# Patient Record
(demographics unavailable — no encounter records)

---

## 2025-02-20 NOTE — PHYSICAL EXAM
[Normal Breath Sounds] : Normal breath sounds [Normal Heart Sounds] : normal heart sounds [Alert] : alert [Oriented to Person] : oriented to person [Oriented to Place] : oriented to place [Oriented to Time] : oriented to time [Calm] : calm [de-identified] : WNL [de-identified] : WNL [de-identified] : BENITOL [de-identified] : WNL [de-identified] : WNL [FreeTextEntry1] : Perianal inspection and digital exam unremarkable.  Mild internal hemorrhoid enlargement on anoscopy.  Anoscopy performed to evaluate anal canal.  No sedation required.

## 2025-02-20 NOTE — HISTORY OF PRESENT ILLNESS
[FreeTextEntry1] : Refugio is a 29 y/o male being seen for rectal bleeding.   Today pt reports feeling no pain.  Formed BMs 1-2 times daily, straining and rectal bleeding with BMs onto toilet bowl, from Saturday-Tuesday 2/8/25 - 2/11/25, last rectal bleeding episode was Monday 2/17/25.  Went to Urgent care in [place] for fever and chills.  Denies prolapsing tissues or swelling.  No incontinence of stool or flatus.  Good appetite.  No c/o nausea or vomiting.  Denies fever and chills.  Not on anticoagulants.

## 2025-02-20 NOTE — ASSESSMENT
[FreeTextEntry1] : I have seen and evaluated the patient, and I have corroborated all nursing input into this note.  The patient had rectal bleeding with bowel movements.  This has resolved.  He has mild hemorrhoid enlargement.  I prescribed a course of hydrocortisone suppositories.  The patient will contact my office immediately if his bleeding recurs.  If the bleeding does recur then arrangements will be made for a colonoscopy.

## 2025-02-20 NOTE — HISTORY OF PRESENT ILLNESS
[FreeTextEntry1] : Refugio is a 29 y/o male being seen for rectal bleeding.   Today pt reports feeling no pain.  Formed BMs 1-2 times daily, straining and rectal bleeding with BMs onto toilet bowl, from Saturday-Tuesday 2/8/25 - 2/11/25, last rectal bleeding episode was Monday 2/17/25.  Went to Urgent care in [place] for fever and chills.  Denies prolapsing tissues or swelling.  No incontinence of stool or flatus.  Good appetite.  No c/o nausea or vomiting.  Denies fever and chills.  Not on anticoagulants.

## 2025-02-20 NOTE — HISTORY OF PRESENT ILLNESS
[FreeTextEntry1] : Refugio is a 27 y/o male being seen for rectal bleeding.   Today pt reports feeling no pain.  Formed BMs 1-2 times daily, straining and rectal bleeding with BMs onto toilet bowl, from Saturday-Tuesday 2/8/25 - 2/11/25, last rectal bleeding episode was Monday 2/17/25.  Went to Urgent care in [place] for fever and chills.  Denies prolapsing tissues or swelling.  No incontinence of stool or flatus.  Good appetite.  No c/o nausea or vomiting.  Denies fever and chills.  Not on anticoagulants.

## 2025-02-20 NOTE — PHYSICAL EXAM
[Normal Breath Sounds] : Normal breath sounds [Normal Heart Sounds] : normal heart sounds [Alert] : alert [Oriented to Person] : oriented to person [Oriented to Place] : oriented to place [Oriented to Time] : oriented to time [Calm] : calm [de-identified] : WNL [de-identified] : BENITOL [de-identified] : WNL [de-identified] : WNL [de-identified] : WNL [FreeTextEntry1] : Perianal inspection and digital exam unremarkable.  Mild internal hemorrhoid enlargement on anoscopy.  Anoscopy performed to evaluate anal canal.  No sedation required.

## 2025-02-20 NOTE — PHYSICAL EXAM
[Normal Breath Sounds] : Normal breath sounds [Normal Heart Sounds] : normal heart sounds [Alert] : alert [Oriented to Person] : oriented to person [Oriented to Place] : oriented to place [Oriented to Time] : oriented to time [Calm] : calm [de-identified] : WNL [de-identified] : BENITOL [de-identified] : WNL [de-identified] : WNL [de-identified] : WNL [FreeTextEntry1] : Perianal inspection and digital exam unremarkable.  Mild internal hemorrhoid enlargement on anoscopy.  Anoscopy performed to evaluate anal canal.  No sedation required.